# Patient Record
Sex: FEMALE | Race: WHITE | URBAN - METROPOLITAN AREA
[De-identification: names, ages, dates, MRNs, and addresses within clinical notes are randomized per-mention and may not be internally consistent; named-entity substitution may affect disease eponyms.]

---

## 2021-07-01 ENCOUNTER — APPOINTMENT (RX ONLY)
Dept: URBAN - METROPOLITAN AREA CLINIC 166 | Facility: CLINIC | Age: 46
Setting detail: DERMATOLOGY
End: 2021-07-01

## 2021-07-01 DIAGNOSIS — L82.1 OTHER SEBORRHEIC KERATOSIS: ICD-10-CM

## 2021-07-01 DIAGNOSIS — Z71.89 OTHER SPECIFIED COUNSELING: ICD-10-CM

## 2021-07-01 DIAGNOSIS — B36.0 PITYRIASIS VERSICOLOR: ICD-10-CM

## 2021-07-01 DIAGNOSIS — D22 MELANOCYTIC NEVI: ICD-10-CM

## 2021-07-01 DIAGNOSIS — D18.0 HEMANGIOMA: ICD-10-CM

## 2021-07-01 PROBLEM — D22.5 MELANOCYTIC NEVI OF TRUNK: Status: ACTIVE | Noted: 2021-07-01

## 2021-07-01 PROBLEM — D18.01 HEMANGIOMA OF SKIN AND SUBCUTANEOUS TISSUE: Status: ACTIVE | Noted: 2021-07-01

## 2021-07-01 PROCEDURE — ? TREATMENT REGIMEN

## 2021-07-01 PROCEDURE — 99203 OFFICE O/P NEW LOW 30 MIN: CPT

## 2021-07-01 PROCEDURE — ? COUNSELING

## 2021-07-01 PROCEDURE — ? PRESCRIPTION

## 2021-07-01 RX ORDER — KETOCONAZOLE 20 MG/G
CREAM TOPICAL
Qty: 1 | Refills: 11 | Status: ERX

## 2021-07-01 RX ORDER — SELENIUM SULFIDE 2.5 MG/100ML
LOTION TOPICAL
Qty: 1 | Refills: 11 | Status: ERX

## 2021-07-01 ASSESSMENT — LOCATION DETAILED DESCRIPTION DERM
LOCATION DETAILED: SUBXIPHOID
LOCATION DETAILED: INFERIOR THORACIC SPINE
LOCATION DETAILED: SUPERIOR THORACIC SPINE

## 2021-07-01 ASSESSMENT — LOCATION SIMPLE DESCRIPTION DERM
LOCATION SIMPLE: ABDOMEN
LOCATION SIMPLE: UPPER BACK

## 2021-07-01 ASSESSMENT — LOCATION ZONE DERM: LOCATION ZONE: TRUNK

## 2021-07-01 NOTE — HPI: EVALUATION OF SKIN LESION(S)
What Type Of Note Output Would You Prefer (Optional)?: Standard Output
Have Your Spot(S) Been Treated In The Past?: has not been treated
Hpi Title: Evaluation of Skin Lesions
Additional History: Patient wants brown spots removed on back and chest cosmetically.

## 2024-06-04 ENCOUNTER — APPOINTMENT (RX ONLY)
Dept: URBAN - METROPOLITAN AREA CLINIC 166 | Facility: CLINIC | Age: 49
Setting detail: DERMATOLOGY
End: 2024-06-04

## 2024-06-04 DIAGNOSIS — D22 MELANOCYTIC NEVI: ICD-10-CM

## 2024-06-04 DIAGNOSIS — R52 PAIN, UNSPECIFIED: ICD-10-CM

## 2024-06-04 PROBLEM — D22.61 MELANOCYTIC NEVI OF RIGHT UPPER LIMB, INCLUDING SHOULDER: Status: ACTIVE | Noted: 2024-06-04

## 2024-06-04 PROCEDURE — ? PATIENT SPECIFIC COUNSELING

## 2024-06-04 PROCEDURE — 99213 OFFICE O/P EST LOW 20 MIN: CPT

## 2024-06-04 PROCEDURE — ? DIAGNOSIS COMMENT

## 2024-06-04 PROCEDURE — ? COUNSELING

## 2024-06-04 ASSESSMENT — LOCATION SIMPLE DESCRIPTION DERM
LOCATION SIMPLE: LEFT CHEEK
LOCATION SIMPLE: RIGHT SHOULDER
LOCATION SIMPLE: LEFT CHEEK

## 2024-06-04 ASSESSMENT — LOCATION ZONE DERM
LOCATION ZONE: FACE
LOCATION ZONE: FACE
LOCATION ZONE: ARM

## 2024-06-04 ASSESSMENT — LOCATION DETAILED DESCRIPTION DERM
LOCATION DETAILED: LEFT CENTRAL MALAR CHEEK
LOCATION DETAILED: LEFT CENTRAL MALAR CHEEK
LOCATION DETAILED: RIGHT POSTERIOR SHOULDER

## 2024-06-04 NOTE — PROCEDURE: PATIENT SPECIFIC COUNSELING
Detail Level: Simple
Reassured this is not evident of anything malignant. Explained this is likely due to nerve impingement. Patient can apply promaxine and capsaicin to distract the nerves if desired.

## 2024-06-04 NOTE — PROCEDURE: DIAGNOSIS COMMENT
Comment: Patient to watch lesion for any changes in color, size or texture and to follow up for biopsy if desired. Explained to patient the ABCDE’s.
Render Risk Assessment In Note?: no
Detail Level: Simple